# Patient Record
Sex: MALE | NOT HISPANIC OR LATINO | Employment: OTHER | URBAN - METROPOLITAN AREA
[De-identification: names, ages, dates, MRNs, and addresses within clinical notes are randomized per-mention and may not be internally consistent; named-entity substitution may affect disease eponyms.]

---

## 2021-01-01 ENCOUNTER — OFFICE VISIT (OUTPATIENT)
Dept: NEUROLOGY | Facility: CLINIC | Age: 71
End: 2021-01-01

## 2021-01-01 ENCOUNTER — TELEPHONE (OUTPATIENT)
Dept: NEUROLOGY | Facility: CLINIC | Age: 71
End: 2021-01-01

## 2021-01-01 ENCOUNTER — PATIENT ROUNDING (BHMG ONLY) (OUTPATIENT)
Dept: NEUROLOGY | Facility: CLINIC | Age: 71
End: 2021-01-01

## 2021-01-01 ENCOUNTER — DOCUMENTATION (OUTPATIENT)
Dept: NEUROLOGY | Facility: CLINIC | Age: 71
End: 2021-01-01

## 2021-01-01 VITALS
SYSTOLIC BLOOD PRESSURE: 114 MMHG | TEMPERATURE: 97.3 F | HEIGHT: 70 IN | HEART RATE: 92 BPM | BODY MASS INDEX: 27.06 KG/M2 | WEIGHT: 189 LBS | OXYGEN SATURATION: 97 % | DIASTOLIC BLOOD PRESSURE: 76 MMHG

## 2021-01-01 VITALS
SYSTOLIC BLOOD PRESSURE: 112 MMHG | HEART RATE: 95 BPM | HEIGHT: 70 IN | DIASTOLIC BLOOD PRESSURE: 72 MMHG | OXYGEN SATURATION: 98 % | WEIGHT: 190 LBS | TEMPERATURE: 97.5 F | BODY MASS INDEX: 27.2 KG/M2

## 2021-01-01 DIAGNOSIS — I48.20 CHRONIC ATRIAL FIBRILLATION (HCC): Primary | ICD-10-CM

## 2021-01-01 DIAGNOSIS — I10 ESSENTIAL HYPERTENSION: ICD-10-CM

## 2021-01-01 DIAGNOSIS — R53.1 WEAKNESS: ICD-10-CM

## 2021-01-01 DIAGNOSIS — I48.20 CHRONIC ATRIAL FIBRILLATION (HCC): ICD-10-CM

## 2021-01-01 DIAGNOSIS — I69.30 SEQUELAE, POST-STROKE: Primary | ICD-10-CM

## 2021-01-01 DIAGNOSIS — R26.89 BALANCE DISORDER: ICD-10-CM

## 2021-01-01 DIAGNOSIS — I69.30 SEQUELAE, POST-STROKE: ICD-10-CM

## 2021-01-01 DIAGNOSIS — I25.10 CORONARY ARTERY DISEASE INVOLVING NATIVE CORONARY ARTERY OF NATIVE HEART WITHOUT ANGINA PECTORIS: ICD-10-CM

## 2021-01-01 PROCEDURE — 99213 OFFICE O/P EST LOW 20 MIN: CPT | Performed by: NURSE PRACTITIONER

## 2021-01-01 PROCEDURE — 99204 OFFICE O/P NEW MOD 45 MIN: CPT | Performed by: NURSE PRACTITIONER

## 2021-01-01 RX ORDER — PEN NEEDLE, DIABETIC 32GX 5/32"
NEEDLE, DISPOSABLE MISCELLANEOUS
COMMUNITY
Start: 2021-01-01

## 2021-01-01 RX ORDER — LISINOPRIL 20 MG/1
20 TABLET ORAL DAILY
COMMUNITY

## 2021-01-01 RX ORDER — FUROSEMIDE 20 MG/1
1 TABLET ORAL DAILY
COMMUNITY

## 2021-01-01 RX ORDER — ASPIRIN 81 MG/1
81 TABLET ORAL DAILY
COMMUNITY

## 2021-01-01 RX ORDER — CARVEDILOL 12.5 MG/1
TABLET ORAL
COMMUNITY
Start: 2021-01-01

## 2021-01-01 RX ORDER — ROSUVASTATIN CALCIUM 40 MG/1
40 TABLET, COATED ORAL DAILY
COMMUNITY

## 2021-01-01 RX ORDER — OMEPRAZOLE 20 MG/1
20 CAPSULE, DELAYED RELEASE ORAL DAILY
COMMUNITY

## 2021-01-01 RX ORDER — EZETIMIBE 10 MG/1
10 TABLET ORAL DAILY
COMMUNITY

## 2021-01-01 RX ORDER — ICOSAPENT ETHYL 1000 MG/1
2 CAPSULE ORAL 2 TIMES DAILY WITH MEALS
COMMUNITY

## 2021-01-01 RX ORDER — INSULIN DEGLUDEC INJECTION 100 U/ML
INJECTION, SOLUTION SUBCUTANEOUS
COMMUNITY
End: 2021-01-01 | Stop reason: DRUGHIGH

## 2021-01-01 RX ORDER — BISOPROLOL FUMARATE 5 MG/1
TABLET, FILM COATED ORAL DAILY
COMMUNITY
End: 2021-01-01 | Stop reason: ALTCHOICE

## 2021-10-19 PROBLEM — I25.10 CORONARY ARTERY DISEASE INVOLVING NATIVE CORONARY ARTERY OF NATIVE HEART WITHOUT ANGINA PECTORIS: Status: ACTIVE | Noted: 2021-01-01

## 2021-10-19 PROBLEM — E11.9 TYPE 2 DIABETES MELLITUS WITHOUT COMPLICATION, WITH LONG-TERM CURRENT USE OF INSULIN (HCC): Status: ACTIVE | Noted: 2021-01-01

## 2021-10-19 PROBLEM — I48.91 ATRIAL FIBRILLATION: Status: ACTIVE | Noted: 2021-01-01

## 2021-10-19 PROBLEM — I10 ESSENTIAL HYPERTENSION: Status: ACTIVE | Noted: 2021-01-01

## 2021-10-19 PROBLEM — E55.9 VITAMIN D DEFICIENCY: Status: ACTIVE | Noted: 2021-01-01

## 2021-10-19 PROBLEM — N28.9 KIDNEY DISEASE: Status: ACTIVE | Noted: 2021-01-01

## 2021-10-19 PROBLEM — Z85.528 HISTORY OF KIDNEY CANCER: Status: ACTIVE | Noted: 2021-01-01

## 2021-10-19 PROBLEM — E78.2 MIXED HYPERLIPIDEMIA: Status: ACTIVE | Noted: 2021-01-01

## 2021-10-19 PROBLEM — Z79.4 TYPE 2 DIABETES MELLITUS WITHOUT COMPLICATION, WITH LONG-TERM CURRENT USE OF INSULIN: Status: ACTIVE | Noted: 2021-01-01

## 2021-10-19 PROBLEM — K21.9 GASTROESOPHAGEAL REFLUX DISEASE WITHOUT ESOPHAGITIS: Status: ACTIVE | Noted: 2021-01-01

## 2021-10-19 NOTE — PROGRESS NOTES
Neuro Office Visit      Encounter Date: 10/19/2021   Patient Name: Reddy Hernandez  : 1950   MRN: [id number]   PCP: Dr Frazier  Card:  [facility] Dr Morrow  Nephrology: Dr Warner  Chief Complaint:    Chief Complaint   Patient presents with   • Stroke     NP       History of Present Illness: Reddy Hernandez is a 71 y.o. male who is here today in Neurology for CVA    CVA  Had normal eye exam and woke up from a nap 2 days later w blurred vision and right hemianopsia. Nap was one hour. Was disoriented for a few minutes. Couldn't remember how much insulin he took.  Returned to opthamalogist and was referred to ER to eval with new onset A-fib and CVA.     Admitted to [facility] 21 He left AMA  MRI with left occipital and posterior temporal lobes of acute infarction with assoc hemorrhage and mild localizaed swelling. Mild to mod of WM lesions.  Echo EF-60-65%, neg bubble study. A-fib.    CTA no stenosis of intracranial or neck arteries. Filling defect in distal V2 segment at left C2 transverse oramen  Possible luminal flap related to short segment dissection. Recommend follow up CTA.  Trigeminal artery. Dermal lesions    Currently on Asa 81, Eliquis 5mg, Crestor 40mg, Zetia  Saw Cardiology after DC, and has follow up next month to discuss Watchman Device.    Still with OD lateral vision loss. No slurred speech, dysphagia, unilateral weakness, numbness or tingling. Both legs feel a little weak. Some dizziness when he first stands.  No falls.  No problems with bladder or bowels. Urinary frequency is chronic.       PMH: DM A1c 8.2, HTN, HLD, CAD w stents, elevated TSH        Subjective      Past Medical History:   Past Medical History:   Diagnosis Date   • Atrial fibrillation (HCC) 10/19/2021   • Diabetes mellitus (HCC)    • Hypertension    • Kidney cancer, primary, with metastasis from kidney to other site (HCC)    • Sleep apnea    • Stroke (HCC)        Past Surgical History:   Past Surgical  History:   Procedure Laterality Date   • CATARACT EXTRACTION  2012    Bilateral [facility]   • CORONARY ANGIOPLASTY WITH STENT PLACEMENT      Dr Edgar x4 RCA and distal circ   • HIATAL HERNIA REPAIR     • NEPHRECTOMY PARTIAL Right    • THROAT SURGERY      benign surgery       Family History:   Family History   Problem Relation Age of Onset   • Diabetes Mother    • Stroke Mother    • Heart disease Father    • Stroke Father    • Diabetes Sister    • Heart disease Sister    • Heart disease Sister        Social History:   Social History     Socioeconomic History   • Marital status:    Tobacco Use   • Smoking status: Former Smoker     Packs/day: 1.00     Years: 55.00     Pack years: 55.00     Quit date: 2021     Years since quittin.0   • Smokeless tobacco: Never Used   Vaping Use   • Vaping Use: Never used   Substance and Sexual Activity   • Alcohol use: Not Currently   • Drug use: Never   • Sexual activity: Defer       Medications:     Current Outpatient Medications:   •  apixaban (ELIQUIS) 5 MG tablet tablet, Take 5 mg by mouth 2 (Two) Times a Day., Disp: , Rfl:   •  aspirin (aspirin) 81 MG EC tablet, Take 81 mg by mouth Daily., Disp: , Rfl:   •  bisoprolol (ZEBeta) 5 MG tablet, Take  by mouth Daily., Disp: , Rfl:   •  ezetimibe (ZETIA) 10 MG tablet, Take 10 mg by mouth Daily., Disp: , Rfl:   •  furosemide (LASIX) 20 MG tablet, Take 1 tablet by mouth Daily., Disp: , Rfl:   •  icosapent ethyl (VASCEPA) 1 g capsule capsule, Take 2 capsules by mouth 2 (Two) Times a Day With Meals., Disp: , Rfl:   •  Insulin Degludec (Tresiba) 100 UNIT/ML solution injection, Inject  under the skin into the appropriate area as directed., Disp: , Rfl:   •  lisinopril (PRINIVIL,ZESTRIL) 20 MG tablet, Take 20 mg by mouth Daily., Disp: , Rfl:   •  metFORMIN (GLUCOPHAGE) 1000 MG tablet, Take 1,000 mg by mouth., Disp: , Rfl:   •  omeprazole (priLOSEC) 20 MG capsule, Take 20 mg by mouth Daily.,  Disp: , Rfl:   •  rosuvastatin (CRESTOR) 40 MG tablet, Take 40 mg by mouth Daily., Disp: , Rfl:   •  SITagliptin (JANUVIA) 100 MG tablet, Take 100 mg by mouth Daily., Disp: , Rfl:     Allergies:   No Known Allergies    PHQ-9 Total Score:     STEADI Fall Risk Assessment was completed, and patient is at LOW risk for falls.Assessment completed on:10/19/2021    Objective     Physical Exam:   Physical Exam  Eyes:      Pupils: Pupils are equal, round, and reactive to light.   Neurological:      Mental Status: He is oriented to person, place, and time.      Coordination: Romberg Test abnormal. Finger-Nose-Finger Test and Heel to Shin Test normal.      Gait: Gait is intact.      Deep Tendon Reflexes:      Reflex Scores:       Tricep reflexes are 2+ on the right side and 2+ on the left side.       Bicep reflexes are 2+ on the right side and 2+ on the left side.       Brachioradialis reflexes are 2+ on the right side and 2+ on the left side.       Patellar reflexes are 2+ on the right side and 2+ on the left side.       Achilles reflexes are 2+ on the right side and 2+ on the left side.  Psychiatric:         Speech: Speech normal.         Neurologic Exam     Mental Status   Oriented to person, place, and time.   Follows 3 step commands.   Attention: normal. Concentration: normal.   Speech: speech is normal   Level of consciousness: alert  Knowledge: consistent with education.   Normal comprehension.     Cranial Nerves     CN II   Visual acuity: normal  Right visual field deficit: upper temporal quadrant(s)  Left visual field deficit: none     CN III, IV, VI   Pupils are equal, round, and reactive to light.  Right pupil: Accommodation: intact.   Left pupil: Accommodation: intact.   CN III: no CN III palsy  CN VI: no CN VI palsy  Nystagmus: none   Diplopia: none  Upgaze: normal  Downgaze: normal  Conjugate gaze: present    CN VII   Facial expression full, symmetric.     CN VIII   Hearing: intact    CN XII   CN XII normal.  "    Motor Exam   Muscle bulk: normal  Overall muscle tone: normal    Strength   Right biceps: 5/5  Left biceps: 5/5  Right triceps: 5/5  Left triceps: 5/5  Right interossei: 5/5  Left interossei: 5/5  Right quadriceps: 5/5  Left quadriceps: 5/5  Right anterior tibial: 5/5  Left anterior tibial: 5/5  Right posterior tibial: 5/5  Left posterior tibial: 5/5    Sensory Exam   Light touch normal.     Gait, Coordination, and Reflexes     Gait  Gait: normal    Coordination   Romberg: positive  Finger to nose coordination: normal  Heel to shin coordination: normal    Tremor   Resting tremor: absent  Action tremor: absent    Reflexes   Right brachioradialis: 2+  Left brachioradialis: 2+  Right biceps: 2+  Left biceps: 2+  Right triceps: 2+  Left triceps: 2+  Right patellar: 2+  Left patellar: 2+  Right achilles: 2+  Left achilles: 2+  Right : 2+  Left : 2+       Vital Signs:   Vitals:    10/19/21 1012   BP: 112/72   Pulse: 95   Temp: 97.5 °F (36.4 °C)   SpO2: 98%   Weight: 86.2 kg (190 lb)   Height: 177.8 cm (70\")     Body mass index is 27.26 kg/m².         Assessment / Plan      Assessment/Plan:   Diagnoses and all orders for this visit:    1. Sequelae, post-stroke (Primary)  Comments:  Cont Eliquis, Asa 81, atorvastatin 80, zetia 10mg  Orders:  -     CT Angiogram Head; Future  -     Ambulatory Referral to Physical Therapy Evaluate and treat    2. Chronic atrial fibrillation (HCC)  Comments:  Cont Eliquis and Asa    3. Weakness  -     Ambulatory Referral to Physical Therapy Evaluate and treat    4. Balance disorder  -     Ambulatory Referral to Physical Therapy Evaluate and treat    5. Essential hypertension  Comments:  BP goal 130/80    6. Coronary artery disease involving native coronary artery of native heart without angina pectoris    Follow up with PCP and cardiology.     Patient Education:  Reviewed medications, potential side effects and signs and symptoms to report. Discussed risk versus benefits of " treatment plan with patient and/or family-including medications, labs and radiology that may be ordered. Addressed questions and concerns during visit. Patient and/or family verbalized understanding and agree with plan. Instructed to call the office with any questions and report to ER with any life-threatening symptoms.     Discussed signs and symptoms of stroke and when to call 911. Instructed to follow a low fat diet including the Mediterranean diet. Instructed to take all medications daily as prescribed. Encouraged 30 minutes of exercise 3-4 times a week as tolerated. Stay well hydrated. Discussed potential side effects of new medications and signs and symptoms to report. If patient is currently using tobacco products, smoking cessation was encouraged. Reviewed stroke risk factors and stroke prevention plan. Patient and/or family verbalizes understanding and agrees with plan.     Follow Up:   Return in about 6 weeks (around 11/30/2021) for Recheck.    During this visit the following were done:  Labs Reviewed [x]    Labs Ordered []    Radiology Reports Reviewed [x]    Radiology Ordered [x]    PCP Records Reviewed []    Referring Provider Records Reviewed []    ER Records Reviewed []    Hospital Records Reviewed [x]    History Obtained From Family []    Radiology Images Reviewed []    Other Reviewed [x]    Records Requested []      Chuck Hudson, MYKEL, APRN

## 2021-10-27 NOTE — TELEPHONE ENCOUNTER
Caller: Mary, Radha    Relationship: [employer]    Best call back number: [phone]    What test was performed: CTA HEAD    When was the test performed: Friday, 10/22/21    Where was the test performed: [facility]    Additional notes: PT'S WIFE CALLED FOR RESULTS AS PT IS SCHEDULED TO SEE HIS CARDIOLOGIST TOMORROW AND WOULD LIKE RESULTS BEFORE GOING. AS OF THE TIME OF PT'S WIFE'S CALL, RESULTS HAD NOT YET BEEN RECEIVED & SCANNED INTO PT'S CHART.    I HAVE CALLED [facility], [phone], SPOKE WITH FREDERICK. FREDERICK WILL BE FAXING THE CTA HEAD RESULTS TO OFFICE FAX [phone], PER MY REQUEST.    PLEASE REVIEW AND ADVISE ONCE RESULTS ARE RECEIVED.

## 2021-10-27 NOTE — PROGRESS NOTES
CTA of head 10/22/21 rec'd from [facility] and scanned into chart. No significant intracranial arterial stenosis or aneurysm present. Atretic post circulation.

## 2021-10-28 NOTE — PROGRESS NOTES
October 28, 2021    Hello, may I speak with Reddy Hernandez?    My name is Tosin    I am  with [facility]  [address]  [place] [address] [phone].    Before we get started may I verify your date of birth? 1950    I am calling to officially welcome you to our practice and ask about your recent visit. Is this a good time to talk? yes    Tell me about your visit with us. What things went well?  the visit went well, everyone was kind.       We're always looking for ways to make our patients' experiences even better. Do you have recommendations on ways we may improve?    No     Overall were you satisfied with your first visit to our practice? yes       I appreciate you taking the time to speak with me today. Is there anything else I can do for you? no      Thank you, and have a great day.

## 2021-12-01 NOTE — PROGRESS NOTES
Neuro Office Visit      Encounter Date: 2021   Patient Name: Reddy Hernandez  : 1950   MRN: [id number]     Chief Complaint:    Chief Complaint   Patient presents with   • Cerebrovascular Accident       History of Present Illness: Reddy Hernandez is a 71 y.o. male who is here today in Neurology with his wife for stroke.    Last visit 10/19/21 w me-cont Eliquis, Asa, Atorvastatin 80, zetia 10. CT angio, refer to PT, FU with PCP and card    CTA Head-no significant intracranial arterial stenosis or aneurysm    Stroke  Compliant with eliquis, asa and atorvastatin 80mg. No side effects. Denies headaches, slurred speech, dysphagia, chest pain, palpitations, unilateral weakness, altered gait. Feels he is back to baseline with improved strength in LE. Still with OD lateral vision loss.    Atrial Fib  Seen by Cardiology at , Dr Murray. Plans for watchman but has not been called for appt. Denies chest pain, sob, palpitations.    HTN  Wife reports labile HTN on coreg bid. sbp <150.    Problem History  2021 awoke with vision changes OD and confusion. Delayed medical care. Admitted to [facility] 21 He left AMA  MRI with left occipital and posterior temporal lobes of acute infarction with assoc hemorrhage and mild localizaed swelling. Mild to mod of WM lesions.  Echo EF-60-65%, neg bubble study. A-fib    Subjective      Past Medical History:   Past Medical History:   Diagnosis Date   • Atrial fibrillation (HCC) 10/19/2021   • Diabetes mellitus (HCC)    • Hypertension    • Kidney cancer, primary, with metastasis from kidney to other site (HCC)    • Sleep apnea    • Stroke (HCC)        Past Surgical History:   Past Surgical History:   Procedure Laterality Date   • CATARACT EXTRACTION  2012    Bilateral [facility]   • CORONARY ANGIOPLASTY WITH STENT PLACEMENT      Dr Edgar x4 RCA and distal circ   • HIATAL HERNIA REPAIR     • NEPHRECTOMY PARTIAL Right    • THROAT  SURGERY      benign surgery       Family History:   Family History   Problem Relation Age of Onset   • Diabetes Mother    • Stroke Mother    • Heart disease Father    • Stroke Father    • Diabetes Sister    • Heart disease Sister    • Heart disease Sister        Social History:   Social History     Socioeconomic History   • Marital status:    Tobacco Use   • Smoking status: Former Smoker     Packs/day: 1.00     Years: 55.00     Pack years: 55.00     Quit date: 2021     Years since quittin.1   • Smokeless tobacco: Never Used   Vaping Use   • Vaping Use: Never used   Substance and Sexual Activity   • Alcohol use: Not Currently   • Drug use: Never   • Sexual activity: Defer       Medications:     Current Outpatient Medications:   •  apixaban (ELIQUIS) 5 MG tablet tablet, Take 5 mg by mouth 2 (Two) Times a Day., Disp: , Rfl:   •  aspirin (aspirin) 81 MG EC tablet, Take 81 mg by mouth Daily., Disp: , Rfl:   •  BD Pen Needle Catrina 2nd Gen 32G X 4 MM misc, , Disp: , Rfl:   •  carvedilol (COREG) 12.5 MG tablet, , Disp: , Rfl:   •  ezetimibe (ZETIA) 10 MG tablet, Take 10 mg by mouth Daily., Disp: , Rfl:   •  furosemide (LASIX) 20 MG tablet, Take 1 tablet by mouth Daily., Disp: , Rfl:   •  icosapent ethyl (VASCEPA) 1 g capsule capsule, Take 2 capsules by mouth 2 (Two) Times a Day With Meals., Disp: , Rfl:   •  Insulin Degludec (TRESIBA FLEXTOUCH SC), 120 Units Daily., Disp: , Rfl:   •  lisinopril (PRINIVIL,ZESTRIL) 20 MG tablet, Take 20 mg by mouth Daily., Disp: , Rfl:   •  metFORMIN (GLUCOPHAGE) 1000 MG tablet, Take 1,000 mg by mouth., Disp: , Rfl:   •  omeprazole (priLOSEC) 20 MG capsule, Take 20 mg by mouth Daily., Disp: , Rfl:   •  rosuvastatin (CRESTOR) 40 MG tablet, Take 40 mg by mouth Daily., Disp: , Rfl:   •  SITagliptin (JANUVIA) 100 MG tablet, Take 100 mg by mouth Daily., Disp: , Rfl:     Allergies:   No Known Allergies    PHQ-9 Total Score:     STEADI Fall Risk Assessment was completed, and patient  is at LOW risk for falls.Assessment completed on:12/1/2021    Objective     Physical Exam:   Physical Exam  Eyes:      Pupils: Pupils are equal, round, and reactive to light.   Neurological:      Mental Status: He is oriented to person, place, and time.      Coordination: Finger-Nose-Finger Test, Heel to Shin Test and Romberg Test normal.      Gait: Gait is intact.      Deep Tendon Reflexes:      Reflex Scores:       Tricep reflexes are 2+ on the right side and 2+ on the left side.       Bicep reflexes are 2+ on the right side and 2+ on the left side.       Brachioradialis reflexes are 2+ on the right side and 2+ on the left side.       Patellar reflexes are 2+ on the right side and 2+ on the left side.       Achilles reflexes are 2+ on the right side and 2+ on the left side.  Psychiatric:         Speech: Speech normal.         Neurologic Exam     Mental Status   Oriented to person, place, and time.   Follows 3 step commands.   Attention: normal. Concentration: normal.   Speech: speech is normal   Level of consciousness: alert  Knowledge: consistent with education.   Normal comprehension.     Cranial Nerves     CN III, IV, VI   Pupils are equal, round, and reactive to light.  Right pupil: Accommodation: intact.   Left pupil: Accommodation: intact.   CN III: no CN III palsy  CN VI: no CN VI palsy  Nystagmus: none   Diplopia: none  Upgaze: normal  Downgaze: normal  Conjugate gaze: present    CN VII   Facial expression full, symmetric.     CN VIII   Hearing: intact    CN XII   CN XII normal.     Motor Exam   Muscle bulk: normal  Overall muscle tone: normal    Strength   Right biceps: 5/5  Left biceps: 5/5  Right triceps: 5/5  Left triceps: 5/5  Right interossei: 5/5  Left interossei: 5/5  Right quadriceps: 5/5  Left quadriceps: 5/5  Right anterior tibial: 5/5  Left anterior tibial: 5/5  Right posterior tibial: 5/5  Left posterior tibial: 5/5    Sensory Exam   Light touch normal.     Gait, Coordination, and Reflexes  "    Gait  Gait: normal    Coordination   Romberg: negative  Finger to nose coordination: normal  Heel to shin coordination: normal    Tremor   Resting tremor: absent  Action tremor: absent    Reflexes   Right brachioradialis: 2+  Left brachioradialis: 2+  Right biceps: 2+  Left biceps: 2+  Right triceps: 2+  Left triceps: 2+  Right patellar: 2+  Left patellar: 2+  Right achilles: 2+  Left achilles: 2+  Right : 2+  Left : 2+       Vital Signs:   Vitals:    12/01/21 1036   BP: 114/76   Pulse: 92   Temp: 97.3 °F (36.3 °C)   SpO2: 97%   Weight: 85.7 kg (189 lb)   Height: 177.8 cm (70\")     Body mass index is 27.12 kg/m².       Assessment / Plan      Assessment/Plan:   Diagnoses and all orders for this visit:    1. Chronic atrial fibrillation (HCC) (Primary)  Comments:  Call Dr Jones's office to check on status of scheduling watchman procedure    2. Sequelae, post-stroke  Comments:  Cont Asa, eliquis and high intensity statin    3. Essential hypertension  Comments:  Cont coreg, lisinopril           Patient Education:   Discussed signs and symptoms of stroke and when to call 911. Instructed to follow a low fat diet including the Mediterranean diet. Instructed to take all medications daily as prescribed. Encouraged 30 minutes of exercise 3-4 times a week as tolerated. Stay well hydrated. Discussed potential side effects of new medications and signs and symptoms to report. If patient is currently using tobacco products, smoking cessation was encouraged. Reviewed stroke risk factors and stroke prevention plan. Patient and/or family verbalizes understanding and agrees with plan.       Reviewed medications, potential side effects and signs and symptoms to report. Discussed risk versus benefits of treatment plan with patient and/or family-including medications, labs and radiology that may be ordered. Addressed questions and concerns during visit. Patient and/or family verbalized understanding and agree with plan. " Instructed to call the office with any questions and report to ER with any life-threatening symptoms.     Follow Up:   PRN  During this visit the following were done:  Labs Reviewed [x]    Labs Ordered [x]    Radiology Reports Reviewed [x]    Radiology Ordered []    PCP Records Reviewed []    Referring Provider Records Reviewed []    ER Records Reviewed []    Hospital Records Reviewed [x]    History Obtained From Family []    Radiology Images Reviewed []    Other Reviewed [x]    Records Requested []      Chuck Hudson, DNP, APRN

## 2022-01-01 ENCOUNTER — HOSPITAL ENCOUNTER (EMERGENCY)
Facility: HOSPITAL | Age: 72
End: 2022-08-18
Attending: EMERGENCY MEDICINE | Admitting: EMERGENCY MEDICINE

## 2022-01-01 VITALS
HEART RATE: 28 BPM | HEIGHT: 70 IN | BODY MASS INDEX: 28.88 KG/M2 | DIASTOLIC BLOOD PRESSURE: 101 MMHG | OXYGEN SATURATION: 71 % | SYSTOLIC BLOOD PRESSURE: 113 MMHG | WEIGHT: 201.72 LBS | RESPIRATION RATE: 18 BRPM

## 2022-01-01 DIAGNOSIS — I46.9 CARDIOPULMONARY ARREST: Primary | ICD-10-CM

## 2022-01-01 PROCEDURE — 94762 N-INVAS EAR/PLS OXIMTRY CONT: CPT

## 2022-01-01 PROCEDURE — 25010000002 EPINEPHRINE 1 MG/10ML SOLUTION PREFILLED SYRINGE: Performed by: EMERGENCY MEDICINE

## 2022-01-01 PROCEDURE — 92950 HEART/LUNG RESUSCITATION CPR: CPT

## 2022-01-01 PROCEDURE — 99284 EMERGENCY DEPT VISIT MOD MDM: CPT

## 2022-01-01 PROCEDURE — 94002 VENT MGMT INPAT INIT DAY: CPT

## 2022-01-01 PROCEDURE — 94799 UNLISTED PULMONARY SVC/PX: CPT

## 2022-01-01 PROCEDURE — 96365 THER/PROPH/DIAG IV INF INIT: CPT

## 2022-01-01 PROCEDURE — 25010000002 EPINEPHRINE 1 MG/ML SOLUTION: Performed by: EMERGENCY MEDICINE

## 2022-01-01 PROCEDURE — 93010 ELECTROCARDIOGRAM REPORT: CPT | Performed by: INTERNAL MEDICINE

## 2022-01-01 PROCEDURE — 93005 ELECTROCARDIOGRAM TRACING: CPT | Performed by: EMERGENCY MEDICINE

## 2022-01-01 RX ORDER — EPINEPHRINE 0.1 MG/ML
SYRINGE (ML) INJECTION
Status: COMPLETED | OUTPATIENT
Start: 2022-01-01 | End: 2022-01-01

## 2022-01-01 RX ADMIN — EPINEPHRINE 1 MG: 0.1 INJECTION INTRACARDIAC; INTRAVENOUS at 01:07

## 2022-01-01 RX ADMIN — EPINEPHRINE 1 MG: 0.1 INJECTION INTRACARDIAC; INTRAVENOUS at 01:20

## 2022-01-01 RX ADMIN — Medication 0.3 MCG/KG/MIN: at 01:17

## 2022-01-01 RX ADMIN — EPINEPHRINE 1 MG: 0.1 INJECTION INTRACARDIAC; INTRAVENOUS at 01:23

## 2022-08-18 NOTE — ED NOTES
Pt comes from [facility] by EMS for cardiac arrest. Pt was apparently admitted there today and is covid positive. Pt had a witness cardiac arrest at 0010. Pt was without a pulse on EMS arrival. Pt was given 9 epinephrines in route. EMS report PEA and asystole were the only rhythms achieved. Pt was intubated by EMS and arrived on David monitor. Pt was continuously coded and regained pulse x2. Family was contacted by MAUREEN Diaz and they agreed to make pt DNR. TOD 0131 d/t medical futility.

## 2022-08-18 NOTE — ED PROVIDER NOTES
EMERGENCY DEPARTMENT ENCOUNTER    CHIEF COMPLAINT  Chief Complaint: Cardiac arrest  History given by: EMS   History limited by: patient unresponsive  Room Number: 16/16  PMD: Mahad Frazier III, MD      HPI:  Pt is a 72 y.o. male who presents to the ED today in full cardiac arrest.  Per EMS, his NH nurse went to change him and found him unresponsive.  CPR begun and EMS called.  Upon their arrival, the patient was unresponsive and in asystole.  CPR was continued and multiple rounds of IV epinephrine was given.  The patient was in PEA at one point during the code but never had a shockable rhythm.  Blood sugar was 250 en route.  They do report that the patient has a history of bladder cancer as well as COVID-19 recently.  The patient's current end-tidal CO2 is 7      PAST MEDICAL HISTORY  Active Ambulatory Problems     Diagnosis Date Noted   • Coronary artery disease involving native coronary artery of native heart without angina pectoris 10/19/2021   • Essential hypertension 10/19/2021   • Gastroesophageal reflux disease without esophagitis 10/19/2021   • History of kidney cancer 10/19/2021   • Kidney disease 10/19/2021   • Mixed hyperlipidemia 10/19/2021   • Type 2 diabetes mellitus without complication, with long-term current use of insulin (HCC) 10/19/2021   • Vitamin D deficiency 10/19/2021   • Atrial fibrillation (HCC) 10/19/2021     Resolved Ambulatory Problems     Diagnosis Date Noted   • No Resolved Ambulatory Problems     Past Medical History:   Diagnosis Date   • Diabetes mellitus (HCC)    • Hypertension    • Kidney cancer, primary, with metastasis from kidney to other site (HCC)    • Sleep apnea    • Stroke (HCC)        PAST SURGICAL HISTORY  Past Surgical History:   Procedure Laterality Date   • CATARACT EXTRACTION  07/24/2012    Bilateral [facility]   • CORONARY ANGIOPLASTY WITH STENT PLACEMENT  2008    Dr Edgar x4 RCA and distal circ   • HIATAL HERNIA REPAIR     •  NEPHRECTOMY PARTIAL Right    • THROAT SURGERY      benign surgery       FAMILY HISTORY  Family History   Problem Relation Age of Onset   • Diabetes Mother    • Stroke Mother    • Heart disease Father    • Stroke Father    • Diabetes Sister    • Heart disease Sister    • Heart disease Sister        SOCIAL HISTORY  Social History     Socioeconomic History   • Marital status:    Tobacco Use   • Smoking status: Former Smoker     Packs/day: 1.00     Years: 55.00     Pack years: 55.00     Quit date: 2021     Years since quittin.9   • Smokeless tobacco: Never Used   Vaping Use   • Vaping Use: Never used   Substance and Sexual Activity   • Alcohol use: Not Currently   • Drug use: Never   • Sexual activity: Defer       ALLERGIES  Patient has no known allergies.    REVIEW OF SYSTEMS  Review of Systems   Unable to perform ROS: Patient unresponsive       PHYSICAL EXAM  ED Triage Vitals   Temp Pulse Resp BP SpO2   -- -- -- -- --      Temp src Heart Rate Source Patient Position BP Location FiO2 (%)   -- -- -- -- --       Physical Exam  Constitutional:       General: He is in acute distress.   HENT:      Head: Normocephalic and atraumatic.   Eyes:      Comments: Pupils fixed and dilated bilaterally   Neck:      Thyroid: No thyromegaly.      Trachea: No tracheal deviation.   Cardiovascular:      Comments: No spontaneous cardiac activity  Pulmonary:      Comments: No spontaneous respiratory activity  Abdominal:      General: There is no distension.      Palpations: Abdomen is soft. There is no mass.   Musculoskeletal:         General: No deformity.   Skin:     General: Skin is warm and dry.      Coloration: Skin is not pale.         LAB RESULTS  Lab Results (last 24 hours)     ** No results found for the last 24 hours. **          I ordered the above labs and reviewed the results    RADIOLOGY  No orders to display        I ordered the above noted radiological studies. Interpreted by radiologist. Reviewed by me in  PACS.       PROCEDURES  Procedures  EKG          EKG time: 0111  Rhythm/Rate: sinus tachycardia, 101  P waves and OH: nml  QRS, axis: widened, RBBB, nml axis   ST and T waves: ST elevation inf/lat     Interpreted Contemporaneously by me, independently viewed  No previous EKG available for comparison      PROGRESS AND CONSULTS     The patient was wearing a facemask upon entrance into the room and remained in such throughout their visit.  I was wearing PPE including a facemask, eye protection, as well as gloves at any point entering the room and throughout the visit    0109  Following ACLS protocol, the patient had continued CPR as well as 1 further round of epinephrine.  The patient regained ROSC at this point.    0120  I did discuss the case with Dr. Andujar, interventional cardiology, who both he and I agree that the patient is not an appropriate candidate for the cardiac Cath Lab due to his instability and low end-tidal CO2 findings.    0125  The patient once again lost his pulse and CPR was begun.  After 1 round of IV epinephrine, the patient did have return of spontaneous circulation.    0131  The patient lost his pulse and had an end-tidal CO2 of 4.  At this point, coding the patient once again was futile as his chance of meaningful recovery was almost 0.  The code was called and the patient was pronounced .    MEDICAL DECISION MAKING  Results were reviewed/discussed with the patient and they were also made aware of online access. Pt also made aware that some labs, such as cultures, will not be resulted during ER visit and follow up with PMD is necessary.     MDM  Number of Diagnoses or Management Options     Amount and/or Complexity of Data Reviewed  Review and summarize past medical records: yes (No recent ED visits, but does follow with urology secondary to bladder cancer)           DIAGNOSIS  Final diagnoses:   Cardiopulmonary arrest (HCC)       DISPOSITION      Latest Documented Vital  Signs:  As of 06:04 EDT  BP- (!) 113/101 HR- (!) 28 Temp-   O2 sat- (!) 71%         Toro Garcia MD  08/18/22 0604

## 2022-08-18 NOTE — CASE MANAGEMENT/SOCIAL WORK
"Spoke with pts spouse, Radha Hernandez, [phone], who resides in [place]. Informed of the critical condition of pt upon arrival and of CPR that was in progress, with a pulse at this time. Also spoke with daughter, Yessi. Both parties are on the way to the hospital. Spouse stated that he wanted to be  A \"DNR\" and that he did not want to be hooked up to machines or have his chest pounded on. Family states that they will be here as soon as they could safely arrive. Informed ER provider of conversation.   "

## 2022-08-18 NOTE — PROGRESS NOTES
RT arrived in room to get ABG per protocol.  Upon arrival, RT found the vent off and pt  . RT was not notified of expiration. Pt extubated by ER tech.

## 2022-08-19 LAB — QT INTERVAL: 310 MS
